# Patient Record
Sex: FEMALE | Race: OTHER | NOT HISPANIC OR LATINO | URBAN - METROPOLITAN AREA
[De-identification: names, ages, dates, MRNs, and addresses within clinical notes are randomized per-mention and may not be internally consistent; named-entity substitution may affect disease eponyms.]

---

## 2017-07-15 ENCOUNTER — INPATIENT (INPATIENT)
Facility: HOSPITAL | Age: 67
LOS: 2 days | Discharge: HOME | End: 2017-07-18
Attending: INTERNAL MEDICINE | Admitting: INTERNAL MEDICINE
Payer: MEDICARE

## 2017-07-15 VITALS
HEIGHT: 65 IN | TEMPERATURE: 98 F | RESPIRATION RATE: 18 BRPM | SYSTOLIC BLOOD PRESSURE: 148 MMHG | WEIGHT: 121.92 LBS | HEART RATE: 66 BPM | OXYGEN SATURATION: 99 % | DIASTOLIC BLOOD PRESSURE: 59 MMHG

## 2017-07-15 DIAGNOSIS — E78.00 PURE HYPERCHOLESTEROLEMIA, UNSPECIFIED: ICD-10-CM

## 2017-07-15 DIAGNOSIS — I10 ESSENTIAL (PRIMARY) HYPERTENSION: ICD-10-CM

## 2017-07-15 DIAGNOSIS — T78.3XXA ANGIONEUROTIC EDEMA, INITIAL ENCOUNTER: ICD-10-CM

## 2017-07-15 DIAGNOSIS — H40.9 UNSPECIFIED GLAUCOMA: ICD-10-CM

## 2017-07-15 LAB
ALBUMIN SERPL ELPH-MCNC: 3.9 G/DL — SIGNIFICANT CHANGE UP (ref 3.3–5)
ALP SERPL-CCNC: 86 U/L — SIGNIFICANT CHANGE UP (ref 40–120)
ALT FLD-CCNC: 36 U/L — SIGNIFICANT CHANGE UP (ref 12–78)
ANION GAP SERPL CALC-SCNC: 6 MMOL/L — SIGNIFICANT CHANGE UP (ref 5–17)
AST SERPL-CCNC: 41 U/L — HIGH (ref 15–37)
BASOPHILS # BLD AUTO: 0.1 K/UL — SIGNIFICANT CHANGE UP (ref 0–0.2)
BASOPHILS NFR BLD AUTO: 2.1 % — HIGH (ref 0–2)
BILIRUB SERPL-MCNC: 0.7 MG/DL — SIGNIFICANT CHANGE UP (ref 0.2–1.2)
BUN SERPL-MCNC: 13 MG/DL — SIGNIFICANT CHANGE UP (ref 7–23)
CALCIUM SERPL-MCNC: 10 MG/DL — SIGNIFICANT CHANGE UP (ref 8.5–10.1)
CHLORIDE SERPL-SCNC: 100 MMOL/L — SIGNIFICANT CHANGE UP (ref 96–108)
CO2 SERPL-SCNC: 33 MMOL/L — HIGH (ref 22–31)
CREAT SERPL-MCNC: 1 MG/DL — SIGNIFICANT CHANGE UP (ref 0.5–1.3)
EOSINOPHIL # BLD AUTO: 0.1 K/UL — SIGNIFICANT CHANGE UP (ref 0–0.5)
EOSINOPHIL NFR BLD AUTO: 2.1 % — SIGNIFICANT CHANGE UP (ref 0–6)
GLUCOSE SERPL-MCNC: 99 MG/DL — SIGNIFICANT CHANGE UP (ref 70–99)
HCT VFR BLD CALC: 36.6 % — SIGNIFICANT CHANGE UP (ref 34.5–45)
HGB BLD-MCNC: 12.9 G/DL — SIGNIFICANT CHANGE UP (ref 11.5–15.5)
LYMPHOCYTES # BLD AUTO: 1.2 K/UL — SIGNIFICANT CHANGE UP (ref 1–3.3)
LYMPHOCYTES # BLD AUTO: 33.8 % — SIGNIFICANT CHANGE UP (ref 13–44)
MCHC RBC-ENTMCNC: 30.3 PG — SIGNIFICANT CHANGE UP (ref 27–34)
MCHC RBC-ENTMCNC: 35.3 GM/DL — SIGNIFICANT CHANGE UP (ref 32–36)
MCV RBC AUTO: 85.8 FL — SIGNIFICANT CHANGE UP (ref 80–100)
MONOCYTES # BLD AUTO: 0.5 K/UL — SIGNIFICANT CHANGE UP (ref 0–0.9)
MONOCYTES NFR BLD AUTO: 13.2 % — SIGNIFICANT CHANGE UP (ref 2–14)
NEUTROPHILS # BLD AUTO: 1.7 K/UL — LOW (ref 1.8–7.4)
NEUTROPHILS NFR BLD AUTO: 48.8 % — SIGNIFICANT CHANGE UP (ref 43–77)
PLAT MORPH BLD: NORMAL — SIGNIFICANT CHANGE UP
PLATELET # BLD AUTO: 186 K/UL — SIGNIFICANT CHANGE UP (ref 150–400)
POTASSIUM SERPL-MCNC: 3.5 MMOL/L — SIGNIFICANT CHANGE UP (ref 3.5–5.3)
POTASSIUM SERPL-SCNC: 3.5 MMOL/L — SIGNIFICANT CHANGE UP (ref 3.5–5.3)
PROT SERPL-MCNC: 7.6 GM/DL — SIGNIFICANT CHANGE UP (ref 6–8.3)
RBC # BLD: 4.26 M/UL — SIGNIFICANT CHANGE UP (ref 3.8–5.2)
RBC # FLD: 12 % — SIGNIFICANT CHANGE UP (ref 11–15)
RBC BLD AUTO: NORMAL — SIGNIFICANT CHANGE UP
SODIUM SERPL-SCNC: 139 MMOL/L — SIGNIFICANT CHANGE UP (ref 135–145)
WBC # BLD: 3.4 K/UL — LOW (ref 3.8–10.5)
WBC # FLD AUTO: 3.4 K/UL — LOW (ref 3.8–10.5)

## 2017-07-15 PROCEDURE — 99292 CRITICAL CARE ADDL 30 MIN: CPT

## 2017-07-15 PROCEDURE — 99291 CRITICAL CARE FIRST HOUR: CPT

## 2017-07-15 RX ORDER — DIPHENHYDRAMINE HCL 50 MG
50 CAPSULE ORAL ONCE
Qty: 0 | Refills: 0 | Status: COMPLETED | OUTPATIENT
Start: 2017-07-15 | End: 2017-07-15

## 2017-07-15 RX ORDER — ALBUTEROL 90 UG/1
2.5 AEROSOL, METERED ORAL EVERY 6 HOURS
Qty: 0 | Refills: 0 | Status: DISCONTINUED | OUTPATIENT
Start: 2017-07-15 | End: 2017-07-18

## 2017-07-15 RX ORDER — METOPROLOL TARTRATE 50 MG
5 TABLET ORAL EVERY 6 HOURS
Qty: 0 | Refills: 0 | Status: DISCONTINUED | OUTPATIENT
Start: 2017-07-15 | End: 2017-07-16

## 2017-07-15 RX ORDER — FAMOTIDINE 10 MG/ML
20 INJECTION INTRAVENOUS EVERY 12 HOURS
Qty: 0 | Refills: 0 | Status: DISCONTINUED | OUTPATIENT
Start: 2017-07-15 | End: 2017-07-16

## 2017-07-15 RX ORDER — FAMOTIDINE 10 MG/ML
20 INJECTION INTRAVENOUS ONCE
Qty: 0 | Refills: 0 | Status: COMPLETED | OUTPATIENT
Start: 2017-07-15 | End: 2017-07-15

## 2017-07-15 RX ORDER — SODIUM CHLORIDE 9 MG/ML
1000 INJECTION, SOLUTION INTRAVENOUS
Qty: 0 | Refills: 0 | Status: DISCONTINUED | OUTPATIENT
Start: 2017-07-15 | End: 2017-07-15

## 2017-07-15 RX ORDER — ENOXAPARIN SODIUM 100 MG/ML
40 INJECTION SUBCUTANEOUS EVERY 24 HOURS
Qty: 0 | Refills: 0 | Status: DISCONTINUED | OUTPATIENT
Start: 2017-07-15 | End: 2017-07-18

## 2017-07-15 RX ORDER — BRIMONIDINE TARTRATE 2 MG/MG
1 SOLUTION/ DROPS OPHTHALMIC
Qty: 0 | Refills: 0 | Status: DISCONTINUED | OUTPATIENT
Start: 2017-07-15 | End: 2017-07-18

## 2017-07-15 RX ORDER — ALBUTEROL 90 UG/1
2.5 AEROSOL, METERED ORAL EVERY 6 HOURS
Qty: 0 | Refills: 0 | Status: DISCONTINUED | OUTPATIENT
Start: 2017-07-15 | End: 2017-07-15

## 2017-07-15 RX ORDER — DIPHENHYDRAMINE HCL 50 MG
50 CAPSULE ORAL EVERY 12 HOURS
Qty: 0 | Refills: 0 | Status: DISCONTINUED | OUTPATIENT
Start: 2017-07-15 | End: 2017-07-16

## 2017-07-15 RX ORDER — SODIUM CHLORIDE 9 MG/ML
1000 INJECTION, SOLUTION INTRAVENOUS
Qty: 0 | Refills: 0 | Status: DISCONTINUED | OUTPATIENT
Start: 2017-07-15 | End: 2017-07-16

## 2017-07-15 RX ADMIN — SODIUM CHLORIDE 75 MILLILITER(S): 9 INJECTION, SOLUTION INTRAVENOUS at 20:30

## 2017-07-15 RX ADMIN — Medication 50 MILLIGRAM(S): at 18:33

## 2017-07-15 RX ADMIN — Medication 50 MILLIGRAM(S): at 10:00

## 2017-07-15 RX ADMIN — Medication 125 MILLIGRAM(S): at 09:55

## 2017-07-15 RX ADMIN — Medication 5 MILLIGRAM(S): at 20:24

## 2017-07-15 RX ADMIN — FAMOTIDINE 20 MILLIGRAM(S): 10 INJECTION INTRAVENOUS at 09:55

## 2017-07-15 NOTE — ED PROVIDER NOTE - PROGRESS NOTE DETAILS
Pt still comfortable, no worsening of swelling, no measurable improvement yet. Breathing comfortably, without distress. ICU consult paged, no response. Pt not improving, but not worsening either. ICU evaluated, pt protecting airway. Will continue to monitor, and possible ICU admission if does not improve.

## 2017-07-15 NOTE — H&P ADULT - NSHPPHYSICALEXAM_GEN_ALL_CORE
GENERAL: NAD, well-groomed, well-developed  HEAD:  Atraumatic, Normocephalic  EYES: EOMI, PERRLA, conjunctiva and sclera clear  ENMT:   Speech clearer,  Upper lip swelling, marked, bilateral. No involvement of lower lip. No tongue involvement  Not able to visualize uvula   NECK: Supple, No JVD, Normal thyroid  NERVOUS SYSTEM:  Alert & Oriented X3, Good concentration; Motor Strength 5/5 B/L upper and lower extremities; DTRs 2+ intact and symmetric  CHEST/LUNG: Clear to percussion bilaterally; No rales, rhonchi, wheezing, or rubs  HEART: Regular rate and rhythm; No murmurs, rubs, or gallops  ABDOMEN: Soft, Nontender, Nondistended; Bowel sounds present  EXTREMITIES:  2+ Peripheral Pulses, No clubbing, cyanosis, or edema  LYMPH: No lymphadenopathy noted  SKIN: No rashes or lesions

## 2017-07-15 NOTE — PROVIDER CONTACT NOTE (EICU) - SITUATION
67F PMH HTN on lisinopril, hyperlipidemia presents to ER with upper lip swelling starting night  prior to admission. Given benadryl, H2 blocker, steroids without improvement. Admitted to ICU for  angioedema secondary to ACE-I. At present time protecting airway

## 2017-07-15 NOTE — H&P ADULT - NSHPREVIEWOFSYSTEMS_GEN_ALL_CORE
CONSTITUTIONAL: No fever, weight loss, or fatigue  EYES: No eye pain, visual disturbances, or discharge  ENMT:  No difficulty hearing, tinnitus, vertigo; No sinus or throat pain + phonation thick   NECK: No pain or stiffness  RESPIRATORY: No cough, wheezing, chills or hemoptysis; No shortness of breath  CARDIOVASCULAR: No chest pain, palpitations, dizziness, or leg swelling  GASTROINTESTINAL: No abdominal or epigastric pain. No nausea, vomiting, or hematemesis; No diarrhea or constipation. No melena or hematochezia.  GENITOURINARY: No dysuria, frequency,  NEUROLOGICAL: No headaches, memory loss, loss of strength, numbness, or tremors  SKIN: No itching, burning, rashes, or lesions   LYMPH NODES: No enlarged glands  ENDOCRINE: No heat or cold intolerance;  MUSCULOSKELETAL: No joint pain or swelling; No muscle, back, or extremity pain  PSYCHIATRIC: No depression, anxiety, mood swings, or difficulty sleeping  HEME/LYMPH: No easy bruising, or bleeding gums  ALLERGY AND IMMUNOLOGIC: No hives or eczema

## 2017-07-15 NOTE — ED ADULT NURSE NOTE - CHIEF COMPLAINT QUOTE
pt complaining of swelling to upper lip since this am. pt has history of htn on lisinopril. denies any respiratory distress.

## 2017-07-15 NOTE — ED PROVIDER NOTE - MEDICAL DECISION MAKING DETAILS
Ddx: Angioedema  Plan: Benadryl, pepcid, solumedrol, observe, pt counselled on no more lisinopril usage or other ACE inhibitor.

## 2017-07-15 NOTE — ED ADULT TRIAGE NOTE - CHIEF COMPLAINT QUOTE
pt complaining of swelling to upper lip since this am. pt has history of htn on lisinopril pt complaining of swelling to upper lip since this am. pt has history of htn on lisinopril. denies any respiratory distress.

## 2017-07-15 NOTE — PHARMACY COMMUNICATION NOTE - COMMENTS
s/w np giovanna - aware crcl<50ml/min, however giovanna wants pt to receive pepcid BID for angioedema

## 2017-07-15 NOTE — H&P ADULT - HISTORY OF PRESENT ILLNESS
Pt is a 68 yo lady with a pmhx of HTN, HL , who presents to the ED with upper lip swelling. She was started on Lisinopril in March. Noticed lip swelling last night around 10 PM. This morning woke up and took her usual Lisinopril dose, and noted that swelling was increased. No sob, no wheezing, no tongue involvement, no pruritis or pain, no hx of this swelling in the past. Given in ER H2 blocker, steroids and benadryl. Upper lip swelling did not  resolve.  Pt did improving, but did not worse. ICU consulted for further management,

## 2017-07-15 NOTE — H&P ADULT - PROBLEM SELECTOR PLAN 1
Admit MICU as d/w  critical care intensivist,  continue H2 blocker, steroids, benadryl, bronchodilators as needed.

## 2017-07-15 NOTE — ED PROVIDER NOTE - CRITICAL CARE PROVIDED
documentation/direct patient care (not related to procedure)/consultation with other physicians/interpretation of diagnostic studies

## 2017-07-15 NOTE — H&P ADULT - NSHPLABSRESULTS_GEN_ALL_CORE
CBC Full  -  ( 15 Jul 2017 10:27 )  WBC Count : 3.4 K/uL  Hemoglobin : 12.9 g/dL  Hematocrit : 36.6 %  Platelet Count - Automated : 186 K/uL  Mean Cell Volume : 85.8 fl  Mean Cell Hemoglobin : 30.3 pg  Mean Cell Hemoglobin Concentration : 35.3 gm/dL  Auto Neutrophil # : 1.7 K/uL  Auto Lymphocyte # : 1.2 K/uL  Auto Monocyte # : 0.5 K/uL  Auto Eosinophil # : 0.1 K/uL  Auto Basophil # : 0.1 K/uL  Auto Neutrophil % : 48.8 %  Auto Lymphocyte % : 33.8 %  Auto Monocyte % : 13.2 %  Auto Eosinophil % : 2.1 %  Auto Basophil % : 2.1 %      07-15    139  |  100  |  13  ----------------------------<  99  3.5   |  33<H>  |  1.00    Ca    10.0      15 Jul 2017 10:27    TPro  7.6  /  Alb  3.9  /  TBili  0.7  /  DBili  x   /  AST  41<H>  /  ALT  36  /  AlkPhos  86  07-15    LIVER FUNCTIONS - ( 15 Jul 2017 10:27 )  Alb: 3.9 g/dL / Pro: 7.6 gm/dL / ALK PHOS: 86 U/L / ALT: 36 U/L / AST: 41 U/L / GGT: x

## 2017-07-15 NOTE — ED PROVIDER NOTE - OBJECTIVE STATEMENT
Pt is a 66 yo lady with a pmhx of HTN, HL who presents to the ED with upper lip swelling. She was started on Lisinopril in March. Noticed lip swelling last night around 10 PM. This morning woke up and took her usual lisionpril dose, and noted that swellign was increased. No sob, no wheezing, no tongue involvement, no pruritis or pain, no hx of this swelling in the past.

## 2017-07-15 NOTE — H&P ADULT - ASSESSMENT
68 y/o  female PMH: HTN, hyperlipidemia presented with upper lip swelling. Will admit to ICU with angioedema, to monitor for airway protection. D/W critical care Dr. Chappell

## 2017-07-16 LAB
ANION GAP SERPL CALC-SCNC: 9 MMOL/L — SIGNIFICANT CHANGE UP (ref 5–17)
BUN SERPL-MCNC: 17 MG/DL — SIGNIFICANT CHANGE UP (ref 7–23)
CALCIUM SERPL-MCNC: 9.5 MG/DL — SIGNIFICANT CHANGE UP (ref 8.5–10.1)
CHLORIDE SERPL-SCNC: 103 MMOL/L — SIGNIFICANT CHANGE UP (ref 96–108)
CHOLEST SERPL-MCNC: 176 MG/DL — SIGNIFICANT CHANGE UP (ref 10–199)
CO2 SERPL-SCNC: 29 MMOL/L — SIGNIFICANT CHANGE UP (ref 22–31)
CREAT SERPL-MCNC: 0.99 MG/DL — SIGNIFICANT CHANGE UP (ref 0.5–1.3)
GLUCOSE SERPL-MCNC: 131 MG/DL — HIGH (ref 70–99)
HBA1C BLD-MCNC: 6.2 % — HIGH (ref 4–5.6)
HCT VFR BLD CALC: 35 % — SIGNIFICANT CHANGE UP (ref 34.5–45)
HDLC SERPL-MCNC: 95 MG/DL — SIGNIFICANT CHANGE UP (ref 40–125)
HGB BLD-MCNC: 12.6 G/DL — SIGNIFICANT CHANGE UP (ref 11.5–15.5)
LIPID PNL WITH DIRECT LDL SERPL: 75 MG/DL — SIGNIFICANT CHANGE UP
MAGNESIUM SERPL-MCNC: 2 MG/DL — SIGNIFICANT CHANGE UP (ref 1.6–2.6)
MCHC RBC-ENTMCNC: 30.9 PG — SIGNIFICANT CHANGE UP (ref 27–34)
MCHC RBC-ENTMCNC: 36.1 GM/DL — HIGH (ref 32–36)
MCV RBC AUTO: 85.6 FL — SIGNIFICANT CHANGE UP (ref 80–100)
PHOSPHATE SERPL-MCNC: 3.3 MG/DL — SIGNIFICANT CHANGE UP (ref 2.5–4.5)
PLATELET # BLD AUTO: 172 K/UL — SIGNIFICANT CHANGE UP (ref 150–400)
POTASSIUM SERPL-MCNC: 3.9 MMOL/L — SIGNIFICANT CHANGE UP (ref 3.5–5.3)
POTASSIUM SERPL-SCNC: 3.9 MMOL/L — SIGNIFICANT CHANGE UP (ref 3.5–5.3)
RBC # BLD: 4.1 M/UL — SIGNIFICANT CHANGE UP (ref 3.8–5.2)
RBC # FLD: 11.8 % — SIGNIFICANT CHANGE UP (ref 11–15)
SODIUM SERPL-SCNC: 141 MMOL/L — SIGNIFICANT CHANGE UP (ref 135–145)
TOTAL CHOLESTEROL/HDL RATIO MEASUREMENT: 1.9 RATIO — LOW (ref 3.3–7.1)
TRIGL SERPL-MCNC: 31 MG/DL — SIGNIFICANT CHANGE UP (ref 10–149)
WBC # BLD: 8.1 K/UL — SIGNIFICANT CHANGE UP (ref 3.8–10.5)
WBC # FLD AUTO: 8.1 K/UL — SIGNIFICANT CHANGE UP (ref 3.8–10.5)

## 2017-07-16 PROCEDURE — 93010 ELECTROCARDIOGRAM REPORT: CPT

## 2017-07-16 RX ORDER — LATANOPROST 0.05 MG/ML
1 SOLUTION/ DROPS OPHTHALMIC; TOPICAL AT BEDTIME
Qty: 0 | Refills: 0 | Status: DISCONTINUED | OUTPATIENT
Start: 2017-07-16 | End: 2017-07-18

## 2017-07-16 RX ORDER — FAMOTIDINE 10 MG/ML
20 INJECTION INTRAVENOUS
Qty: 0 | Refills: 0 | Status: DISCONTINUED | OUTPATIENT
Start: 2017-07-16 | End: 2017-07-16

## 2017-07-16 RX ORDER — FAMOTIDINE 10 MG/ML
20 INJECTION INTRAVENOUS DAILY
Qty: 0 | Refills: 0 | Status: COMPLETED | OUTPATIENT
Start: 2017-07-16 | End: 2017-07-18

## 2017-07-16 RX ORDER — AMLODIPINE BESYLATE 2.5 MG/1
5 TABLET ORAL DAILY
Qty: 0 | Refills: 0 | Status: DISCONTINUED | OUTPATIENT
Start: 2017-07-16 | End: 2017-07-18

## 2017-07-16 RX ORDER — DIPHENHYDRAMINE HCL 50 MG
50 CAPSULE ORAL EVERY 12 HOURS
Qty: 0 | Refills: 0 | Status: DISCONTINUED | OUTPATIENT
Start: 2017-07-16 | End: 2017-07-18

## 2017-07-16 RX ADMIN — ENOXAPARIN SODIUM 40 MILLIGRAM(S): 100 INJECTION SUBCUTANEOUS at 05:55

## 2017-07-16 RX ADMIN — FAMOTIDINE 20 MILLIGRAM(S): 10 INJECTION INTRAVENOUS at 11:27

## 2017-07-16 RX ADMIN — LATANOPROST 1 DROP(S): 0.05 SOLUTION/ DROPS OPHTHALMIC; TOPICAL at 22:00

## 2017-07-16 RX ADMIN — Medication 40 MILLIGRAM(S): at 05:56

## 2017-07-16 RX ADMIN — BRIMONIDINE TARTRATE 1 DROP(S): 2 SOLUTION/ DROPS OPHTHALMIC at 05:56

## 2017-07-16 RX ADMIN — Medication 5 MILLIGRAM(S): at 01:00

## 2017-07-16 RX ADMIN — FAMOTIDINE 20 MILLIGRAM(S): 10 INJECTION INTRAVENOUS at 05:56

## 2017-07-16 RX ADMIN — Medication 50 MILLIGRAM(S): at 05:56

## 2017-07-16 RX ADMIN — Medication 5 MILLIGRAM(S): at 05:56

## 2017-07-16 RX ADMIN — Medication 50 MILLIGRAM(S): at 17:13

## 2017-07-16 RX ADMIN — AMLODIPINE BESYLATE 5 MILLIGRAM(S): 2.5 TABLET ORAL at 11:27

## 2017-07-16 RX ADMIN — Medication 40 MILLIGRAM(S): at 11:27

## 2017-07-16 RX ADMIN — Medication 40 MILLIGRAM(S): at 01:00

## 2017-07-16 RX ADMIN — BRIMONIDINE TARTRATE 1 DROP(S): 2 SOLUTION/ DROPS OPHTHALMIC at 17:13

## 2017-07-16 NOTE — PROGRESS NOTE ADULT - ASSESSMENT
66 y/o  female PMH: HTN, hyperlipidemia presented with angioedema sec to ACEI. admitted to ICU with angioedema, to monitor for airway protection,    -now resolved  -switch to PO prednisone  -cont benadryl and pepcid  -start on norvasc for BP control  -monitor BP  -DVT/GI ppx

## 2017-07-16 NOTE — CHART NOTE - NSCHARTNOTEFT_GEN_A_CORE
[facility] NP NOTE    HPI   Pt is a 68 yo lady with a pmhx of HTN, HL , who presents to the ED with upper lip swelling. She was started on Lisinopril in March. Noticed lip swelling last night around 10 PM. This morning woke up and took her usual Lisinopril dose, and noted that swelling was increased. No sob, no wheezing, no tongue involvement, no pruritis or pain, no hx of this swelling in the past. Given in ER H2 blocker, steroids and benadryl. Upper lip swelling did not  resolve.  Pt did improving, but did not worse. ICU consulted for further management. Patient  admitted to MICU to monitor airway.  Continued H2 blocker, steroids, benadryl, bronchodilators. Patient's condition improved. Able to take PO. Placed on Norvasc for BP control. IV steroids, H2 blocker and benadryl changed to PO to be continued for additional 3 days.  Patient  stable for transfer to non monitored bed.   Report given to Dr. Ailyn Galaviz, NP-C  Critical Care

## 2017-07-16 NOTE — PROGRESS NOTE ADULT - SUBJECTIVE AND OBJECTIVE BOX
HPI:  Pt is a 66 yo lady with a pmhx of HTN, HL , who presents to the ED with upper lip swelling. She was started on Lisinopril in March. Noticed lip swelling last night around 10 PM. This morning woke up and took her usual Lisinopril dose, and noted that swelling was increased. No sob, no wheezing, no tongue involvement, no pruritis or pain, no hx of this swelling in the past. Given in ER H2 blocker, steroids and benadryl. Upper lip swelling did not  resolve.  Pt did improving, but did not worse. ICU consulted for further management, (15 Jul 2017 19:02)      PAST MEDICAL & SURGICAL HISTORY:  Glaucoma  Hypercholesterolemia  HTN (hypertension)      REVIEW OF SYSTEMS:    CONSTITUTIONAL:   comfortable presents  no  complaints  EYES: No eye pain, visual disturbances, or  ENMT:  No difficulty hearing,No sinus or throat pain swollen  upper  lip  NECK: No pain or stiffness  BREASTS: No pain,  RESPIRATORY: No cough,  No shortness of breath  CARDIOVASCULAR: No chest pain, palpitations, dizziness,   GASTROINTESTINAL: No abdominal or epigastric pain. No nausea, vomiting, or hematemesis; No diarrhea or constipation. No melena or hematochezia.  GENITOURINARY: No dysuria, frequency, hematuria, or incontinence  NEUROLOGICAL: No headaches, loss of strength, numbness, or tremors  SKIN: No itching, burning, rashes, or lesions   LYMPH NODES: No enlarged glands  MUSCULOSKELETAL: No joint pain no  back  pain  PSYCHIATRIC: No depression, anxiety, mood swings,   HEME/LYMPH: No easy bruising, or bleeding gums  ALLERY AND IMMUNOLOGIC: No hives or eczema      MEDICATIONS  (STANDING):  enoxaparin Injectable 40 milliGRAM(s) SubCutaneous every 24 hours  brimonidine 0.2% Ophthalmic Solution 1 Drop(s) Both EYES two times a day  amLODIPine   Tablet 5 milliGRAM(s) Oral daily  predniSONE   Tablet 40 milliGRAM(s) Oral daily  diphenhydrAMINE   Capsule 50 milliGRAM(s) Oral every 12 hours  famotidine    Tablet 20 milliGRAM(s) Oral daily    MEDICATIONS  (PRN):  ALBUTerol    0.083% 2.5 milliGRAM(s) Nebulizer every 6 hours PRN Bronchospasm      Allergies    lisinopril (Angioedema)    Intolerances        SOCIAL HISTORY:    FAMILY HISTORY:  No pertinent family history in first degree relatives      Vital Signs Last 24 Hrs  T(C): 36.7 (16 Jul 2017 11:25), Max: 37.1 (16 Jul 2017 03:00)  T(F): 98 (16 Jul 2017 11:25), Max: 98.8 (16 Jul 2017 03:00)  HR: 72 (16 Jul 2017 09:00) (57 - 78)  BP: 156/53 (16 Jul 2017 08:00) (116/62 - 193/75)  BP(mean): 80 (16 Jul 2017 08:00) (70 - 142)  RR: 15 (16 Jul 2017 09:00) (10 - 28)  SpO2: 98% (16 Jul 2017 09:00) (96% - 100%)    PHYSICAL EXAM:    GENERAL: NAD, well-groomed, well-developed  HEAD:  Atraumatic, Normocephalic  EYES: EOMI, PERRLA, conjunctiva and sclera clear  ENMT:  swollwe  upper  lip No tonsillar erythema, exudates, or enlargement; Moist mucous membranes, , No lesions  NECK: Supple, No JVD, Normal thyroid  NERVOUS SYSTEM:  Alert & Oriented X4, ; Motor Strength 5/5 B/L upper and lower extremities; DTRs 2+ intact and symmetric  CHEST/LUNG: Clear  bilaterally; No rales, rhonchi, wheezing, or rubs  HEART: Regular rate and rhythm; No murmurs, rubs, or gallops  ABDOMEN: Soft, Nontender, Nondistended; no  masses Bowel sounds present  EXTREMITIES:  + Peripheral Pulses, No clubbing, cyanosis, or edema  LYMPH: No lymphadenopathy noted   RECTAL: deferred  SKIN: No rashes or lesions      LABS:                        12.6   8.1   )-----------( 172      ( 16 Jul 2017 03:56 )             35.0     07-16    141  |  103  |  17  ----------------------------<  131<H>  3.9   |  29  |  0.99    Ca    9.5      16 Jul 2017 03:56  Phos  3.3     07-16  Mg     2.0     07-16    TPro  7.6  /  Alb  3.9  /  TBili  0.7  /  DBili  x   /  AST  41<H>  /  ALT  36  /  AlkPhos  86  07-15          RADIOLOGY & ADDITIONAL STUDIES:  HPI:  Pt is a 66 yo lady with a pmhx of HTN, HL , who presents to the ED with upper lip swelling. She was started on Lisinopril in March. Noticed lip swelling last night around 10 PM. This morning woke up and took her usual Lisinopril dose, and noted that swelling was increased. No sob, no wheezing, no tongue involvement, no pruritis or pain, no hx of this swelling in the past. Given in ER H2 blocker, steroids and benadryl. Upper lip swelling did not  resolve.  Pt did improving, but did not worse. ICU consulted for further management, (15 Jul 2017 19:02)    HPI:  Pt is a 66 yo lady with a pmhx of HTN, HL , who presents to the ED with upper lip swelling. She was started on Lisinopril in March. Noticed lip swelling last night around 10 PM. This morning woke up and took her usual Lisinopril dose, and noted that swelling was increased. No sob, no wheezing, no tongue involvement, no pruritis or pain, no hx of this swelling in the past. Given in ER H2 blocker, steroids and benadryl. Upper lip swelling did not  resolve.  Pt did improving, but did not worse. ICU consulted for further management, (15 Jul 2017 19:02)    PAST MEDICAL & SURGICAL HISTORY:  Glaucoma  Hypercholesterolemia  HTN (hypertension)      REVIEW OF SYSTEMS:    CONSTITUTIONAL:   comfortable presents  no  complaints  EYES: No eye pain, visual disturbances, or  ENMT:  No difficulty hearing,No sinus or throat pain  NECK: No pain or stiffness  BREASTS: No pain,  RESPIRATORY: No cough,  No shortness of breath  CARDIOVASCULAR: No chest pain, palpitations, dizziness,   GASTROINTESTINAL: No abdominal or epigastric pain. No nausea, vomiting, or hematemesis; No diarrhea or constipation. No melena or hematochezia.  GENITOURINARY: No dysuria, frequency, hematuria, or incontinence  NEUROLOGICAL: No headaches, loss of strength, numbness, or tremors  SKIN: No itching, burning, rashes, or lesions   LYMPH NODES: No enlarged glands  MUSCULOSKELETAL: No joint pain no  back  pain  PSYCHIATRIC: No depression, anxiety, mood swings,   HEME/LYMPH: No easy bruising, or bleeding gums  ALLERY AND IMMUNOLOGIC: No hives or eczema      MEDICATIONS  (STANDING):  enoxaparin Injectable 40 milliGRAM(s) SubCutaneous every 24 hours  brimonidine 0.2% Ophthalmic Solution 1 Drop(s) Both EYES two times a day  amLODIPine   Tablet 5 milliGRAM(s) Oral daily  predniSONE   Tablet 40 milliGRAM(s) Oral daily  diphenhydrAMINE   Capsule 50 milliGRAM(s) Oral every 12 hours  famotidine    Tablet 20 milliGRAM(s) Oral daily    MEDICATIONS  (PRN):  ALBUTerol    0.083% 2.5 milliGRAM(s) Nebulizer every 6 hours PRN Bronchospasm      Allergies    lisinopril (Angioedema)    Intolerances        SOCIAL HISTORY:    FAMILY HISTORY:  No pertinent family history in first degree relatives      Vital Signs Last 24 Hrs  T(C): 36.7 (16 Jul 2017 11:25), Max: 37.1 (16 Jul 2017 03:00)  T(F): 98 (16 Jul 2017 11:25), Max: 98.8 (16 Jul 2017 03:00)  HR: 72 (16 Jul 2017 09:00) (57 - 78)  BP: 156/53 (16 Jul 2017 08:00) (116/62 - 193/75)  BP(mean): 80 (16 Jul 2017 08:00) (70 - 142)  RR: 15 (16 Jul 2017 09:00) (10 - 28)  SpO2: 98% (16 Jul 2017 09:00) (96% - 100%)    PHYSICAL EXAM:    GENERAL: NAD, well-groomed, well-developed  HEAD:  Atraumatic, Normocephalic  EYES: EOMI, PERRLA, conjunctiva and sclera clear  ENMT: No tonsillar erythema, exudates, or enlargement; Moist mucous membranes, , No lesions  NECK: Supple, No JVD, Normal thyroid  NERVOUS SYSTEM:  Alert & Oriented X4, ; Motor Strength 5/5 B/L upper and lower extremities; DTRs 2+ intact and symmetric  CHEST/LUNG: Clear  bilaterally; No rales, rhonchi, wheezing, or rubs  HEART: Regular rate and rhythm; No murmurs, rubs, or gallops  ABDOMEN: Soft, Nontender, Nondistended; no  masses Bowel sounds present  EXTREMITIES:  + Peripheral Pulses, No clubbing, cyanosis, or edema  LYMPH: No lymphadenopathy noted   RECTAL: deferred  BREAST: No palpatble masses, skin no lesions no retractions, no discharages. adenexa no palpable masses noted   SKIN: No rashes or lesions      LABS:                        12.6   8.1   )-----------( 172      ( 16 Jul 2017 03:56 )             35.0     07-16    141  |  103  |  17  ----------------------------<  131<H>  3.9   |  29  |  0.99    Ca    9.5      16 Jul 2017 03:56  Phos  3.3     07-16  Mg     2.0     07-16    TPro  7.6  /  Alb  3.9  /  TBili  0.7  /  DBili  x   /  AST  41<H>  /  ALT  36  /  AlkPhos  86  07-15          RADIOLOGY & ADDITIONAL STUDIES:

## 2017-07-16 NOTE — PROGRESS NOTE ADULT - SUBJECTIVE AND OBJECTIVE BOX
HPI:  Pt is a 68 yo lady with a pmhx of HTN, HL , who presents to the ED with upper lip swelling. She was started on Lisinopril in March. Noticed lip swelling last night around 10 PM. This morning woke up and took her usual Lisinopril dose, and noted that swelling was increased. No sob, no wheezing, no tongue involvement, no pruritis or pain, no hx of this swelling in the past. Given in ER H2 blocker, steroids and benadryl. Upper lip swelling did not  resolve.  Pt did improving, but did not worse. ICU consulted for further management, (15 Jul 2017 19:02)    Over 24 Hrs: swelling decreased, pt talking and eating    PAST MEDICAL & SURGICAL HISTORY:  Glaucoma  Hypercholesterolemia  HTN (hypertension)      ## ROS:   CONSTITUTIONAL: No fever, chills  EYES: No eye pain, visual disturbances  ENMT:  No difficulty hearing, No sinus or throat pain  RESPIRATORY: No cough, wheezing, hemoptysis; No shortness of breath  CARDIOVASCULAR: No chest pain, palpitations  GASTROINTESTINAL: No abdominal or epigastric pain. No nausea, vomiting, or hematemesis; No diarrhea. No melena or hematochezia.  GENITOURINARY: No dysuria, frequency, hematuria  NEUROLOGICAL: No headaches  ENDOCRINE: No heat or cold intolerance  MUSCULOSKELETAL: No joint pain or swelling; No muscle, back, or extremity pain    ## O/E:  Vitals: T(C): 36.7 (07-16-17 @ 11:25), Max: 37.1 (07-16-17 @ 03:00)  HR: 71 (07-16-17 @ 11:32) (59 - 78)  BP: 147/56 (07-16-17 @ 11:25) (116/62 - 193/75)  BP(mean): 80 (07-16-17 @ 11:25) (70 - 142)  RR: 16 (07-16-17 @ 11:32) (10 - 28)  SpO2: 99% (07-16-17 @ 11:32) (96% - 100%)  Wt(kg): --  Gen: lying comfortably in bed in no apparent distress  HEENT: PERRL, EOMI, upper lip mildly swollen  Resp: CTA B/L no c/r/w  CVS: S1S2 no m/r/g  Abd: soft NT/ND +BS  Ext: no c/c/e  Neuro: A&Ox3      07-15 @ 07:01 - 07-16 @ 07:00  --------------------------------------------------------  IN: 1200 mL / OUT: 1000 mL / NET: 200 mL    07-16 @ 07:01  - 07-16 @ 13:57  --------------------------------------------------------  IN: 520 mL / OUT: 400 mL / NET: 120 mL          ## Labs:                        12.6   8.1   )-----------( 172      ( 16 Jul 2017 03:56 )             35.0     07-16    141  |  103  |  17  ----------------------------<  131<H>  3.9   |  29  |  0.99    Ca    9.5      16 Jul 2017 03:56  Phos  3.3     07-16  Mg     2.0     07-16    TPro  7.6  /  Alb  3.9  /  TBili  0.7  /  DBili  x   /  AST  41<H>  /  ALT  36  /  AlkPhos  86  07-15        ## Imaging: reviewed    MEDICATIONS  (STANDING):  enoxaparin Injectable 40 milliGRAM(s) SubCutaneous every 24 hours  brimonidine 0.2% Ophthalmic Solution 1 Drop(s) Both EYES two times a day  amLODIPine   Tablet 5 milliGRAM(s) Oral daily  predniSONE   Tablet 40 milliGRAM(s) Oral daily  diphenhydrAMINE   Capsule 50 milliGRAM(s) Oral every 12 hours  famotidine    Tablet 20 milliGRAM(s) Oral daily  latanoprost 0.005% Ophthalmic Solution 1 Drop(s) Both EYES at bedtime    MEDICATIONS  (PRN):  ALBUTerol    0.083% 2.5 milliGRAM(s) Nebulizer every 6 hours PRN Bronchospasm    ## Code status:  Goals of care discussion: [x] yes [ ] no  [x] full code  [ ] DNR  [ ] DNI  [ ] JONIST

## 2017-07-17 LAB
ANION GAP SERPL CALC-SCNC: 7 MMOL/L — SIGNIFICANT CHANGE UP (ref 5–17)
BUN SERPL-MCNC: 18 MG/DL — SIGNIFICANT CHANGE UP (ref 7–23)
CALCIUM SERPL-MCNC: 9.7 MG/DL — SIGNIFICANT CHANGE UP (ref 8.5–10.1)
CHLORIDE SERPL-SCNC: 105 MMOL/L — SIGNIFICANT CHANGE UP (ref 96–108)
CO2 SERPL-SCNC: 32 MMOL/L — HIGH (ref 22–31)
CREAT SERPL-MCNC: 1.01 MG/DL — SIGNIFICANT CHANGE UP (ref 0.5–1.3)
GLUCOSE SERPL-MCNC: 118 MG/DL — HIGH (ref 70–99)
HCT VFR BLD CALC: 35.1 % — SIGNIFICANT CHANGE UP (ref 34.5–45)
HGB BLD-MCNC: 12.5 G/DL — SIGNIFICANT CHANGE UP (ref 11.5–15.5)
MAGNESIUM SERPL-MCNC: 2.6 MG/DL — SIGNIFICANT CHANGE UP (ref 1.6–2.6)
MCHC RBC-ENTMCNC: 30.6 PG — SIGNIFICANT CHANGE UP (ref 27–34)
MCHC RBC-ENTMCNC: 35.7 GM/DL — SIGNIFICANT CHANGE UP (ref 32–36)
MCV RBC AUTO: 85.7 FL — SIGNIFICANT CHANGE UP (ref 80–100)
PHOSPHATE SERPL-MCNC: 1.5 MG/DL — LOW (ref 2.5–4.5)
PLATELET # BLD AUTO: 173 K/UL — SIGNIFICANT CHANGE UP (ref 150–400)
POTASSIUM SERPL-MCNC: 4 MMOL/L — SIGNIFICANT CHANGE UP (ref 3.5–5.3)
POTASSIUM SERPL-SCNC: 4 MMOL/L — SIGNIFICANT CHANGE UP (ref 3.5–5.3)
RBC # BLD: 4.09 M/UL — SIGNIFICANT CHANGE UP (ref 3.8–5.2)
RBC # FLD: 12.1 % — SIGNIFICANT CHANGE UP (ref 11–15)
SODIUM SERPL-SCNC: 144 MMOL/L — SIGNIFICANT CHANGE UP (ref 135–145)
WBC # BLD: 7.8 K/UL — SIGNIFICANT CHANGE UP (ref 3.8–10.5)
WBC # FLD AUTO: 7.8 K/UL — SIGNIFICANT CHANGE UP (ref 3.8–10.5)

## 2017-07-17 RX ORDER — HYDRALAZINE HCL 50 MG
10 TABLET ORAL ONCE
Qty: 0 | Refills: 0 | Status: COMPLETED | OUTPATIENT
Start: 2017-07-17 | End: 2017-07-17

## 2017-07-17 RX ADMIN — Medication 50 MILLIGRAM(S): at 18:08

## 2017-07-17 RX ADMIN — Medication 40 MILLIGRAM(S): at 06:18

## 2017-07-17 RX ADMIN — BRIMONIDINE TARTRATE 1 DROP(S): 2 SOLUTION/ DROPS OPHTHALMIC at 18:08

## 2017-07-17 RX ADMIN — Medication 10 MILLIGRAM(S): at 19:01

## 2017-07-17 RX ADMIN — Medication 50 MILLIGRAM(S): at 06:18

## 2017-07-17 RX ADMIN — FAMOTIDINE 20 MILLIGRAM(S): 10 INJECTION INTRAVENOUS at 11:46

## 2017-07-17 RX ADMIN — AMLODIPINE BESYLATE 5 MILLIGRAM(S): 2.5 TABLET ORAL at 06:18

## 2017-07-17 RX ADMIN — ENOXAPARIN SODIUM 40 MILLIGRAM(S): 100 INJECTION SUBCUTANEOUS at 06:18

## 2017-07-17 RX ADMIN — LATANOPROST 1 DROP(S): 0.05 SOLUTION/ DROPS OPHTHALMIC; TOPICAL at 21:40

## 2017-07-17 RX ADMIN — BRIMONIDINE TARTRATE 1 DROP(S): 2 SOLUTION/ DROPS OPHTHALMIC at 06:18

## 2017-07-17 NOTE — PROGRESS NOTE ADULT - SUBJECTIVE AND OBJECTIVE BOX
INTERVAL HPI/OVERNIGHT EVENTS:        REVIEW OF SYSTEMS:  CONSTITUTIONAL: feels  much  improved  lip  swelling  resolved    NECK: No pain or stiffnes  RESPIRATORY: No SOB   CARDIOVASCULAR: No chest pain, palpitations, dizziness,   GASTROINTESTINAL: No abdominal pain. No nausea, vomiting,   NEUROLOGICAL: No headaches, no  blurry  vision no  dizziness  SKIN: No itching,   MUSCULOSKELETAL: No pain    MEDICATION:  enoxaparin Injectable 40 milliGRAM(s) SubCutaneous every 24 hours  brimonidine 0.2% Ophthalmic Solution 1 Drop(s) Both EYES two times a day  ALBUTerol    0.083% 2.5 milliGRAM(s) Nebulizer every 6 hours PRN  amLODIPine   Tablet 5 milliGRAM(s) Oral daily  predniSONE   Tablet 40 milliGRAM(s) Oral daily  diphenhydrAMINE   Capsule 50 milliGRAM(s) Oral every 12 hours  famotidine    Tablet 20 milliGRAM(s) Oral daily  latanoprost 0.005% Ophthalmic Solution 1 Drop(s) Both EYES at bedtime    Vital Signs Last 24 Hrs  T(C): 36.9 (17 Jul 2017 05:05), Max: 37.2 (16 Jul 2017 20:14)  T(F): 98.4 (17 Jul 2017 05:05), Max: 99 (16 Jul 2017 20:14)  HR: 70 (17 Jul 2017 05:05) (63 - 76)  BP: 168/72 (17 Jul 2017 05:05) (124/99 - 168/72)  BP(mean): 81 (16 Jul 2017 16:11) (79 - 106)  RR: 17 (17 Jul 2017 05:05) (11 - 25)  SpO2: 97% (17 Jul 2017 05:05) (96% - 100%)    PHYSICAL EXAM:  GENERAL: NAD, well-groomed, well-developed  EYES:  conjunctiva and sclera clear  ENMT:  Moist mucous membranes,   NECK: Supple, No JVD, Normal thyroid  NERVOUS SYSTEM:  Alert oriented   no  focal  deficits;   CHEST/LUNG: Clear    HEART: Regular rate and rhythm; No murmurs, rubs, or gallops  ABDOMEN: Soft, Nontender, Nondistended; Bowel sounds present  EXTREMITIES:  no  edema no  tenderness  SKIN: No rashes   LABS:                        12.5   7.8   )-----------( 173      ( 17 Jul 2017 07:33 )             35.1     07-17    144  |  105  |  18  ----------------------------<  118<H>  4.0   |  32<H>  |  1.01    Ca    9.7      17 Jul 2017 07:33  Phos  1.5     07-17  Mg     2.6     07-17    TPro  7.6  /  Alb  3.9  /  TBili  0.7  /  DBili  x   /  AST  41<H>  /  ALT  36  /  AlkPhos  86  07-15        CAPILLARY BLOOD GLUCOSE          RADIOLOGY & ADDITIONAL TESTS:    Imaging reports  Personally Reviewed:  [ x] YES  [ ] NO    Consultant(s) Notes Reviewed:  [x ] YES  [ ] NO    Care Discussed with Consultants/Other Providers [x ] YES  [ ] NO  Assessment and Plan:   Problem/Plan - 1:  ·  Problem: Angioedema, initial encounter.  Plan: off  ACE  on  steroids  antihstamine  H2  blockers.     Problem/Plan - 2:  ·  Problem: HTN (hypertension).  Plan: norvasc. might  need  further  adjustment    Problem/Plan - 3:  ·  Problem: Glaucoma.  Plan: brimonidine.     Problem/Plan - 4:  ·  Problem: Hypercholesterolemia.  Plan: restart  atorvastatin.   discharge  in  AM    Electronic Signatures:  Jase Robertson)  (Signed 16-Jul-2017 11:30)  	Authored: Progress Note, Subjective and Objective, Assessment and Plan      Last Updated: 16-Jul-2017 11:30 by Jase Robertson)

## 2017-07-18 ENCOUNTER — TRANSCRIPTION ENCOUNTER (OUTPATIENT)
Age: 67
End: 2017-07-18

## 2017-07-18 VITALS — SYSTOLIC BLOOD PRESSURE: 146 MMHG | DIASTOLIC BLOOD PRESSURE: 69 MMHG

## 2017-07-18 RX ORDER — LATANOPROST 0.05 MG/ML
1 SOLUTION/ DROPS OPHTHALMIC; TOPICAL
Qty: 0 | Refills: 0 | COMMUNITY
Start: 2017-07-18

## 2017-07-18 RX ORDER — HYDROCHLOROTHIAZIDE 25 MG
25 TABLET ORAL DAILY
Qty: 0 | Refills: 0 | Status: DISCONTINUED | OUTPATIENT
Start: 2017-07-18 | End: 2017-07-18

## 2017-07-18 RX ORDER — AMLODIPINE BESYLATE 2.5 MG/1
1 TABLET ORAL
Qty: 30 | Refills: 0 | OUTPATIENT
Start: 2017-07-18 | End: 2017-08-17

## 2017-07-18 RX ADMIN — BRIMONIDINE TARTRATE 1 DROP(S): 2 SOLUTION/ DROPS OPHTHALMIC at 06:00

## 2017-07-18 RX ADMIN — ENOXAPARIN SODIUM 40 MILLIGRAM(S): 100 INJECTION SUBCUTANEOUS at 05:59

## 2017-07-18 RX ADMIN — Medication 40 MILLIGRAM(S): at 06:00

## 2017-07-18 RX ADMIN — FAMOTIDINE 20 MILLIGRAM(S): 10 INJECTION INTRAVENOUS at 11:11

## 2017-07-18 RX ADMIN — Medication 50 MILLIGRAM(S): at 06:00

## 2017-07-18 RX ADMIN — AMLODIPINE BESYLATE 5 MILLIGRAM(S): 2.5 TABLET ORAL at 06:00

## 2017-07-18 NOTE — DISCHARGE NOTE ADULT - HOSPITAL COURSE
patient  observed  in  ICU  of f AE  on  Steroids H2  blockers  Antihistamine  labial  and  oropharyngeal  swelling  resolved. no  difficulty  breatihng  no  difficulty  swallowing  ambulating  freely HCTZ continued  Norvasc  added  to  regimen patient  discharged  home  off [facility]

## 2017-07-18 NOTE — DISCHARGE NOTE ADULT - MEDICATION SUMMARY - MEDICATIONS TO TAKE
I will START or STAY ON the medications listed below when I get home from the hospital:    atorvastatin 40 mg oral tablet  -- 1 tab(s) by mouth once a day  -- Indication: For Hypercholesterolemia    amLODIPine 5 mg oral tablet  -- 1 tab(s) by mouth once a day  -- Indication: For HTN (hypertension)    hydroCHLOROthiazide 25 mg oral tablet  -- 1 tab(s) by mouth once a day  -- Indication: For HTN (hypertension)    latanoprost 0.005% ophthalmic solution  -- 1 drop(s) to each affected eye once a day (at bedtime)  -- Indication: For laucoma    brimonidine 0.2% ophthalmic solution  --  to each affected eye   -- Indication: For Glaucoma

## 2017-07-18 NOTE — DISCHARGE NOTE ADULT - PATIENT PORTAL LINK FT
“You can access the [employer] Patient Portal, offered by [facility], by registering with the following website: [email or web address]”

## 2017-07-18 NOTE — DISCHARGE NOTE ADULT - MEDICATION SUMMARY - MEDICATIONS TO STOP TAKING
I will STOP taking the medications listed below when I get home from the hospital:    lisinopril 40 mg oral tablet  -- 1 tab(s) by mouth once a day    strong heart  --    omegaxl  --    calcium  --    probioticxl  --

## 2017-07-18 NOTE — PROGRESS NOTE ADULT - SUBJECTIVE AND OBJECTIVE BOX
INTERVAL HPI/OVERNIGHT EVENTS:        REVIEW OF SYSTEMS:  CONSTITUTIONAL:feels  wwell presents   no  complaints    NECK: No pain or stiffnes  RESPIRATORY: No SOB   CARDIOVASCULAR: No chest pain, palpitations, dizziness,   GASTROINTESTINAL: No abdominal pain. No nausea, vomiting,   NEUROLOGICAL: No headaches, no  blurry  vision no  dizziness  SKIN: No itching,   MUSCULOSKELETAL: No pain    MEDICATION:  enoxaparin Injectable 40 milliGRAM(s) SubCutaneous every 24 hours  brimonidine 0.2% Ophthalmic Solution 1 Drop(s) Both EYES two times a day  ALBUTerol    0.083% 2.5 milliGRAM(s) Nebulizer every 6 hours PRN  amLODIPine   Tablet 5 milliGRAM(s) Oral daily  diphenhydrAMINE   Capsule 50 milliGRAM(s) Oral every 12 hours  famotidine    Tablet 20 milliGRAM(s) Oral daily  latanoprost 0.005% Ophthalmic Solution 1 Drop(s) Both EYES at bedtime  hydrochlorothiazide 25 milliGRAM(s) Oral daily    Vital Signs Last 24 Hrs  T(C): 36.7 (18 Jul 2017 05:09), Max: 37.1 (17 Jul 2017 17:51)  T(F): 98 (18 Jul 2017 05:09), Max: 98.8 (17 Jul 2017 17:51)  HR: 70 (18 Jul 2017 05:09) (70 - 87)  BP: 146/69 (18 Jul 2017 06:56) (146/69 - 177/84)  BP(mean): --  RR: 17 (18 Jul 2017 05:09) (17 - 18)  SpO2: 98% (18 Jul 2017 05:09) (97% - 98%)    PHYSICAL EXAM:  GENERAL: NAD, well-groomed, well-developed  EYES:  conjunctiva and sclera clear  ENMT:  Moist mucous membranes,   NECK: Supple, No JVD, Normal thyroid  NERVOUS SYSTEM:  Alert oriented   no  focal  deficits;   CHEST/LUNG: Clear    HEART: Regular rate and rhythm; No murmurs, rubs, or gallops  ABDOMEN: Soft, Nontender, Nondistended; Bowel sounds present  EXTREMITIES:  no  edema no  tenderness  SKIN: No rashes   LABS:                        12.5   7.8   )-----------( 173      ( 17 Jul 2017 07:33 )             35.1     07-17    144  |  105  |  18  ----------------------------<  118<H>  4.0   |  32<H>  |  1.01    Ca    9.7      17 Jul 2017 07:33  Phos  1.5     07-17  Mg     2.6     07-17          CAPILLARY BLOOD GLUCOSE          RADIOLOGY & ADDITIONAL TESTS:    Imaging reports  Personally Reviewed:  [x ] YES  [ ] NO      Care Discussed with Consultants/Other Providers [ x] YES  [ ] NO  Assessment and Plan:   Problem/Plan - 1:  ·  Problem: Angioedema, initial encounter.  Plan: off  ACE  on  steroids  antihstamine  H2  blockers.     Problem/Plan - 2:  ·  Problem: HTN (hypertension).  Plan: norvasc. might  need  further  adjustment    Problem/Plan - 3:  ·  Problem: Glaucoma.  Plan: brimonidine.     Problem/Plan - 4:  ·  Problem: Hypercholesterolemia.  Plan: restart  atorvastatin.   discharge  home

## 2017-07-18 NOTE — DISCHARGE NOTE ADULT - CARE PLAN
Principal Discharge DX:	Angioedema, initial encounter  Goal:	avidanc e f recurrence  Instructions for follow-up, activity and diet:	avid  ACE  Secondary Diagnosis:	HTN (hypertension)  Secondary Diagnosis:	Hypercholesterolemia  Secondary Diagnosis:	Glaucoma

## 2017-07-20 DIAGNOSIS — E78.00 PURE HYPERCHOLESTEROLEMIA, UNSPECIFIED: ICD-10-CM

## 2017-07-20 DIAGNOSIS — I10 ESSENTIAL (PRIMARY) HYPERTENSION: ICD-10-CM

## 2017-07-20 DIAGNOSIS — H40.9 UNSPECIFIED GLAUCOMA: ICD-10-CM

## 2017-07-20 DIAGNOSIS — T46.4X5A ADVERSE EFFECT OF ANGIOTENSIN-CONVERTING-ENZYME INHIBITORS, INITIAL ENCOUNTER: ICD-10-CM

## 2017-07-20 DIAGNOSIS — E78.5 HYPERLIPIDEMIA, UNSPECIFIED: ICD-10-CM

## 2017-07-20 DIAGNOSIS — T78.3XXA ANGIONEUROTIC EDEMA, INITIAL ENCOUNTER: ICD-10-CM

## 2017-08-07 RX ORDER — LISINOPRIL 2.5 MG/1
1 TABLET ORAL
Qty: 0 | Refills: 0 | COMMUNITY

## 2017-08-07 RX ORDER — BRIMONIDINE TARTRATE 2 MG/MG
0 SOLUTION/ DROPS OPHTHALMIC
Qty: 0 | Refills: 0 | COMMUNITY

## 2017-08-07 RX ORDER — LATANOPROST 0.05 MG/ML
1 SOLUTION/ DROPS OPHTHALMIC; TOPICAL
Qty: 0 | Refills: 0 | COMMUNITY

## 2017-08-07 RX ORDER — ATORVASTATIN CALCIUM 80 MG/1
1 TABLET, FILM COATED ORAL
Qty: 0 | Refills: 0 | COMMUNITY

## 2019-05-18 PROBLEM — H40.9 UNSPECIFIED GLAUCOMA: Chronic | Status: ACTIVE | Noted: 2017-07-15

## 2019-05-18 PROBLEM — I10 ESSENTIAL (PRIMARY) HYPERTENSION: Chronic | Status: ACTIVE | Noted: 2017-07-15

## 2019-05-18 PROBLEM — E78.00 PURE HYPERCHOLESTEROLEMIA, UNSPECIFIED: Chronic | Status: ACTIVE | Noted: 2017-07-15

## 2019-05-22 ENCOUNTER — APPOINTMENT (OUTPATIENT)
Dept: ORTHOPEDIC SURGERY | Facility: CLINIC | Age: 69
End: 2019-05-22
Payer: MEDICARE

## 2019-05-22 VITALS — DIASTOLIC BLOOD PRESSURE: 78 MMHG | HEART RATE: 61 BPM | SYSTOLIC BLOOD PRESSURE: 209 MMHG

## 2019-05-22 PROCEDURE — 72100 X-RAY EXAM L-S SPINE 2/3 VWS: CPT

## 2019-05-22 PROCEDURE — 99203 OFFICE O/P NEW LOW 30 MIN: CPT

## 2019-05-29 ENCOUNTER — OTHER (OUTPATIENT)
Age: 69
End: 2019-05-29

## 2019-06-05 ENCOUNTER — APPOINTMENT (OUTPATIENT)
Dept: MRI IMAGING | Facility: CLINIC | Age: 69
End: 2019-06-05

## 2019-06-14 ENCOUNTER — CHART COPY (OUTPATIENT)
Age: 69
End: 2019-06-14

## 2019-06-26 ENCOUNTER — CHART COPY (OUTPATIENT)
Age: 69
End: 2019-06-26

## 2019-06-26 NOTE — PHYSICAL EXAM
[de-identified] : Examination of the lumbar spine reveals no midline tenderness palpation, step-offs, or skin lesions. Decreased range of motion with respect to flexion, extension, lateral bending, and rotation. No tenderness to palpation of the sciatic notch. No tenderness palpation of the bilateral greater trochanters. No pain with passive internal/external rotation of the hips. No instability of bilateral lower extremities.  Negative MARU. Negative straight leg raise bilaterally. No bowstring. Negative femoral stretch. 5 out of 5 iliopsoas, hip abductors, hips adductors, quadriceps, hamstrings, gastrocsoleus, tibialis anterior, extensor hallucis longus, peroneals. Grossly intact sensation to light touch bilateral lower extremities. 1+ patellar and Achilles reflexes. Downgoing Babinski. No clonus. Intact proprioception. Palpable pulses. No skin lesion and no edema on the right and left lower extremities. [de-identified] : AP lateral lumbar x-rays reveals L4-5 spondylolisthesis

## 2019-06-26 NOTE — HISTORY OF PRESENT ILLNESS
[de-identified] : Patient is a 69-year-old female with a chief complaint of back pain. She complains of pain worse with activity. No recent treatment. No numbness, tingling, weakness, or bowel or bladder dysfunction. No difficulty with balance or fine motor skills. No fevers or chills. No constitutional symptoms or recent unintended weight loss.

## 2019-06-28 ENCOUNTER — FORM ENCOUNTER (OUTPATIENT)
Age: 69
End: 2019-06-28

## 2019-06-29 ENCOUNTER — APPOINTMENT (OUTPATIENT)
Dept: CT IMAGING | Facility: CLINIC | Age: 69
End: 2019-06-29
Payer: MEDICARE

## 2019-06-29 ENCOUNTER — OUTPATIENT (OUTPATIENT)
Dept: OUTPATIENT SERVICES | Facility: HOSPITAL | Age: 69
LOS: 1 days | End: 2019-06-29
Payer: MEDICARE

## 2019-06-29 DIAGNOSIS — Z00.8 ENCOUNTER FOR OTHER GENERAL EXAMINATION: ICD-10-CM

## 2019-06-29 PROCEDURE — 72131 CT LUMBAR SPINE W/O DYE: CPT | Mod: 26

## 2019-06-29 PROCEDURE — 72131 CT LUMBAR SPINE W/O DYE: CPT

## 2019-07-05 ENCOUNTER — CHART COPY (OUTPATIENT)
Age: 69
End: 2019-07-05

## 2019-07-12 ENCOUNTER — APPOINTMENT (OUTPATIENT)
Dept: ORTHOPEDIC SURGERY | Facility: CLINIC | Age: 69
End: 2019-07-12
Payer: MEDICARE

## 2019-07-12 VITALS
WEIGHT: 121 LBS | HEIGHT: 65 IN | BODY MASS INDEX: 20.16 KG/M2 | HEART RATE: 69 BPM | DIASTOLIC BLOOD PRESSURE: 77 MMHG | SYSTOLIC BLOOD PRESSURE: 186 MMHG

## 2019-07-12 DIAGNOSIS — M48.07 SPINAL STENOSIS, LUMBOSACRAL REGION: ICD-10-CM

## 2019-07-12 DIAGNOSIS — M43.16 SPONDYLOLISTHESIS, LUMBAR REGION: ICD-10-CM

## 2019-07-12 DIAGNOSIS — M54.16 RADICULOPATHY, LUMBAR REGION: ICD-10-CM

## 2019-07-12 PROCEDURE — 99214 OFFICE O/P EST MOD 30 MIN: CPT

## 2019-07-12 NOTE — PHYSICAL EXAM
[de-identified] : Examination of the lumbar spine reveals no midline tenderness palpation, step-offs, or skin lesions. Decreased range of motion with respect to flexion, extension, lateral bending, and rotation. No tenderness to palpation of the sciatic notch. No tenderness palpation of the bilateral greater trochanters. No pain with passive internal/external rotation of the hips. No instability of bilateral lower extremities.  Negative MARU. Negative straight leg raise bilaterally. No bowstring. Negative femoral stretch. 5 out of 5 iliopsoas, hip abductors, hips adductors, quadriceps, hamstrings, gastrocsoleus, tibialis anterior, extensor hallucis longus, peroneals. Grossly intact sensation to light touch bilateral lower extremities. 1+ patellar and Achilles reflexes. Downgoing Babinski. No clonus. Intact proprioception. Palpable pulses. No skin lesion and no edema on the right and left lower extremities. [de-identified] : AP lateral lumbar x-rays reveals L4-5 spondylolisthesis\par \par CT scan reveals L4-5 spondylolisthesis with significant stenosis. There is also vacuum disc at L5-S1 with some stenosis

## 2019-07-12 NOTE — DISCUSSION/SUMMARY
[de-identified] : We discussed further treatment options. She's had nonsurgical care in the past but nothing recently. We did discuss the role of surgery with a lumbar decompression and instrumented fusion. She like to be reevaluated for nonoperative treatment in the form of physical therapy and epidural injections. She will be referred for this. Follow up afterwards.

## 2019-07-12 NOTE — HISTORY OF PRESENT ILLNESS
[de-identified] : Ms. Pitts is a 69-year-old female who is here for a follow-up visit to review her lumbar CT results.  She has persistent low back pain (right > left) and cramping in both feet.  Standing and walking is difficult.  Sitting does not relieve her symptoms.

## 2021-04-01 ENCOUNTER — APPOINTMENT (OUTPATIENT)
Age: 71
End: 2021-04-01
Payer: MEDICARE

## 2021-04-01 PROCEDURE — 0031A: CPT

## 2022-11-15 ENCOUNTER — APPOINTMENT (OUTPATIENT)
Dept: GERIATRICS | Facility: CLINIC | Age: 72
End: 2022-11-15

## 2022-11-15 VITALS
RESPIRATION RATE: 15 BRPM | WEIGHT: 132 LBS | TEMPERATURE: 97.5 F | SYSTOLIC BLOOD PRESSURE: 154 MMHG | DIASTOLIC BLOOD PRESSURE: 76 MMHG | OXYGEN SATURATION: 98 % | BODY MASS INDEX: 20.96 KG/M2 | HEIGHT: 66.5 IN | HEART RATE: 62 BPM

## 2022-11-15 DIAGNOSIS — Z81.8 FAMILY HISTORY OF OTHER MENTAL AND BEHAVIORAL DISORDERS: ICD-10-CM

## 2022-11-15 DIAGNOSIS — Z87.891 PERSONAL HISTORY OF NICOTINE DEPENDENCE: ICD-10-CM

## 2022-11-15 DIAGNOSIS — Z11.59 ENCOUNTER FOR SCREENING FOR OTHER VIRAL DISEASES: ICD-10-CM

## 2022-11-15 DIAGNOSIS — R32 UNSPECIFIED URINARY INCONTINENCE: ICD-10-CM

## 2022-11-15 DIAGNOSIS — Z87.898 PERSONAL HISTORY OF OTHER SPECIFIED CONDITIONS: ICD-10-CM

## 2022-11-15 PROCEDURE — G0444 DEPRESSION SCREEN ANNUAL: CPT

## 2022-11-15 PROCEDURE — 99205 OFFICE O/P NEW HI 60 MIN: CPT | Mod: 25

## 2022-11-15 NOTE — HISTORY OF PRESENT ILLNESS
[No falls in past year] : Patient reported no falls in the past year [Patient is independent with] : bathing [Independent] : transferring/mobility [Full assistance needed] : Assistance needed managing medications [] : Assistance needed managing finances. [0] : 1) Little interest or pleasure doing things: Not at all (0) [1] : 2) Feeling down, depressed, or hopeless for several days (1) [PHQ-2 Negative - No further assessment needed] : PHQ-2 Negative - No further assessment needed [I have developed a follow-up plan documented below in the note.] : I have developed a follow-up plan documented below in the note. [FreeTextEntry1] : \par \par Pt reports family brought her for evaluation of memory loss - she would like to have that fixed. \par \par Family provides additional history history. \par \par COGNITIVE CHANGES / [x ] MEMORY LOSS\par - Memory loss x >4yrs ago - repeating herself often. Now sometimes forgets that she's cooking and burns pots - last time it happened last week.\par Forgets directions even in familiar neighborhood. \par Gets very agitated about certain conversations - finances especially, thinking that people are making changes to her account - taking out and putting in money.  Episodes of thinking that Eliazar Tyra or Clemencia Soledad reached out to her and asked her to give money to others. Has purchased money orders to send to these people.  THinks that people are coming to get her because she doesn' send them money.  \par Was sent to [place] because of the paranoia to stay with sister - was calmer because didn't have to worry about the bills, etc. and was busy with sister. When returned more paranoid again. \par Forgets where she places things then says someone stole it. 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 Personality changed after aneurysm - became very Gnosticist, didn't believe in wearing pants or jewelrly, went back to work, stopped smoking and drinking alcohol. After a few years started drinking alcholol socially (never excessively).  Doesn't go out with friends as much as she used to prior to aneurysm. \par \par - Motor Syx: Gait instability since approx 2020 and getting worse, Knee arthritis\par Deny h/o falls\par \par - Sleep: goes to bed approx 9pm and wakes up at 3am \par \par - Appetite: good\par \par - Mood/behavior: generally pleasant, sometimes sad when feels "lost"\par \par - MOCA: \par - LABS: reviewed from 9/22 w/ PMD - cbc, cmp, lipids (high), TSH, b12, Folate\par - MRI Brain :  unable to get d/t aneurysm clips per family \par - PET FDG Brain : \par \par [x ] Neuro - Seen by neuro Dr. Ag in 2015 - family states was told "she's just bored."\par THen seen by another neuro DR. Villalobos outside of  and was told that "it's just age." \par [ ] Psych\par \par \par PMD Dr. Lindo - told family that she has memory issues.   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

## 2022-11-15 NOTE — REVIEW OF SYSTEMS
[Constipation] : constipation [Incontinence] : incontinence [As Noted in HPI] : as noted in HPI [Negative] : Heme/Lymph

## 2022-11-15 NOTE — REASON FOR VISIT
[Initial Evaluation] : an initial evaluation [FreeTextEntry1] : memory loss  [FreeTextEntry3] : dtnahum Montilla and  Eliazar

## 2022-11-15 NOTE — CURRENT MEDS
[] : missed dose(s) of medications. Reason(s): [Yes] : Reviewed medication list for presence of high-risk medications. [Patient/caregiver able to verbalize understanding of medications, including indications and side effects] : Patient/caregiver able to verbalize understanding of medications, including indications and side effects

## 2022-11-15 NOTE — PHYSICAL EXAM
[Normal] : no spinal tenderness [No Clubbing, Cyanosis] : no clubbing or cyanosis of the fingernails [Involuntary Movements] : no involuntary movements were seen [Normal Affect] : the affect was normal [Normal Mood] : the mood was normal [Normal Gait] : abnormal gait [Normal Insight/Judgment] : insight and judgment were not intact

## 2022-11-15 NOTE — ASSESSMENT
[FreeTextEntry1] : BW from 9/22 done with PMD reviewed. \par \par Family will try to obtain prior brain imaging, states pt was told cannot have MRI d/t clips placed for aneurysm repair.\par \par Would benefit from inc supervision for safety and help with ADLs\par Will refer to SW for additional counseling, education, support, resources\par \par HCP form briefly discussed -will assess capacity for completion at next visit\par \par Will consider neuro/neuropsych referral \par \par Fall precuations\par PT?\par \par Plan for cognitive/mood assessment at next visit.\par \par f/u in 2-4 wks, unless earlier PRN \par \par

## 2022-11-21 ENCOUNTER — APPOINTMENT (OUTPATIENT)
Dept: GERIATRICS | Facility: CLINIC | Age: 72
End: 2022-11-21

## 2022-11-21 VITALS
BODY MASS INDEX: 20.67 KG/M2 | RESPIRATION RATE: 15 BRPM | WEIGHT: 130 LBS | OXYGEN SATURATION: 96 % | SYSTOLIC BLOOD PRESSURE: 154 MMHG | HEART RATE: 71 BPM | DIASTOLIC BLOOD PRESSURE: 62 MMHG | TEMPERATURE: 98.1 F

## 2022-11-21 DIAGNOSIS — M25.562 PAIN IN LEFT KNEE: ICD-10-CM

## 2022-11-21 PROCEDURE — 99483 ASSMT & CARE PLN PT COG IMP: CPT

## 2023-01-26 NOTE — PATIENT PROFILE ADULT. - AS SC BRADEN ACTIVITY
Quality 226: Preventive Care And Screening: Tobacco Use: Screening And Cessation Intervention: Patient screened for tobacco use and is an ex/non-smoker
Quality 130: Documentation Of Current Medications In The Medical Record: Current Medications Documented
Detail Level: Detailed
(1) bedfast

## 2023-02-08 ENCOUNTER — NON-APPOINTMENT (OUTPATIENT)
Age: 73
End: 2023-02-08

## 2023-03-01 ENCOUNTER — APPOINTMENT (OUTPATIENT)
Dept: NEUROLOGY | Facility: CLINIC | Age: 73
End: 2023-03-01
Payer: MEDICARE

## 2023-03-01 PROCEDURE — 96132 NRPSYC TST EVAL PHYS/QHP 1ST: CPT

## 2023-03-01 PROCEDURE — 96116 NUBHVL XM PHYS/QHP 1ST HR: CPT

## 2023-03-01 PROCEDURE — 96133 NRPSYC TST EVAL PHYS/QHP EA: CPT

## 2023-03-01 PROCEDURE — 96138 PSYCL/NRPSYC TECH 1ST: CPT | Mod: NC

## 2023-03-01 PROCEDURE — 96121 NUBHVL XM PHY/QHP EA ADDL HR: CPT

## 2023-03-01 PROCEDURE — 96139 PSYCL/NRPSYC TST TECH EA: CPT | Mod: NC

## 2023-03-08 ENCOUNTER — APPOINTMENT (OUTPATIENT)
Dept: NEUROLOGY | Facility: CLINIC | Age: 73
End: 2023-03-08

## 2023-03-31 ENCOUNTER — APPOINTMENT (OUTPATIENT)
Dept: GERIATRICS | Facility: CLINIC | Age: 73
End: 2023-03-31
Payer: MEDICARE

## 2023-03-31 VITALS
DIASTOLIC BLOOD PRESSURE: 82 MMHG | SYSTOLIC BLOOD PRESSURE: 140 MMHG | TEMPERATURE: 97.1 F | RESPIRATION RATE: 16 BRPM | HEART RATE: 66 BPM | WEIGHT: 132.25 LBS | HEIGHT: 66 IN | BODY MASS INDEX: 21.25 KG/M2 | OXYGEN SATURATION: 98 %

## 2023-03-31 DIAGNOSIS — Z74.1 NEED FOR ASSISTANCE WITH PERSONAL CARE: ICD-10-CM

## 2023-03-31 DIAGNOSIS — H40.9 UNSPECIFIED GLAUCOMA: ICD-10-CM

## 2023-03-31 DIAGNOSIS — F22 DELUSIONAL DISORDERS: ICD-10-CM

## 2023-03-31 DIAGNOSIS — H54.7 UNSPECIFIED VISUAL LOSS: ICD-10-CM

## 2023-03-31 DIAGNOSIS — E55.9 VITAMIN D DEFICIENCY, UNSPECIFIED: ICD-10-CM

## 2023-03-31 PROCEDURE — 99214 OFFICE O/P EST MOD 30 MIN: CPT

## 2023-03-31 RX ORDER — ATORVASTATIN CALCIUM 40 MG/1
40 TABLET, FILM COATED ORAL
Refills: 0 | Status: ACTIVE | COMMUNITY
Start: 2022-04-08

## 2023-03-31 RX ORDER — LATANOPROST/PF 0.005 %
0.01 DROPS OPHTHALMIC (EYE)
Qty: 5 | Refills: 0 | Status: ACTIVE | COMMUNITY
Start: 2022-10-31

## 2023-03-31 RX ORDER — TIMOLOL MALEATE 5 MG/ML
0.5 SOLUTION OPHTHALMIC
Qty: 10 | Refills: 0 | Status: ACTIVE | COMMUNITY
Start: 2022-04-08

## 2023-03-31 RX ORDER — AMLODIPINE BESYLATE 5 MG/1
5 TABLET ORAL
Refills: 0 | Status: ACTIVE | COMMUNITY
Start: 2022-08-22

## 2023-04-06 NOTE — ASSESSMENT
[FreeTextEntry1] : - Neuropsych eval appreciated - f/u for completion of evaluation. \par Family unclear about plan for f/u with NS - will d/w Dr. Milan to clarify. \par \par f/u med records - family will try to obtain prior brain imaging, states pt was told pt now cannot have MRI d/t clips previously placed for aneurysm repair.\par \par Neuro referral discussed - family will consider \par \par - To consider trial of antidepressant for depressed mood/anxiety - will consider trial SSRi vs Duloxetine vs Gabapentin at next visit\par  \par - Advised inc daytime physical/cognitive activity - to consider formal day program and respite stays when possible/needed\par - Advised to consider support groups and classes for caregiver\par - Education, counseling, and support provided \par - Would benefit from formal care at home to help with personal care, etc. \par - Referred to  for additional counseling, education, support, and community resources\par \par - Pending appt with DCS/NP Jose for ADC Program enrollment \par \par Adv to avoid/limit use of NSAIDs\par Fall precuations\par \par Rest as per PMD\par \par f/u with me PRN

## 2023-04-06 NOTE — REASON FOR VISIT
[Follow-Up] : a follow-up visit [FreeTextEntry1] : dementia  [FreeTextEntry3] : dtnahum Montilla and  Eliazar  [FreeTextEntry2] : who assist with history due to patient with baseline cognitive impairment

## 2023-04-06 NOTE — HISTORY OF PRESENT ILLNESS
[No falls in past year] : Patient reported no falls in the past year [Patient is independent with] : bathing [Independent] : transferring/mobility [Full assistance needed] : Assistance needed managing medications [Moderate] : Stage: Moderate [Stable] : Status: Stable [Memory Lapses Or Loss] : stable memory impairment [Patient Observed To Be Agitated] : stable agitation [Hostility Toward Caregivers] : denies aggression [Sleep Disturbances] : stable sleep disturbances [] : denies wandering [Fixed Beliefs Contradicted By Reality (Delusions)] : denies delusions [Difficulty Finding Desired Words] : stable difficulty finding desired words [Other reason not done] : Other reason not done [FreeTextEntry1] : Seen by Neuropsych Dr. Milan in 3/23 - chart note appreciated in EMR. Pending f/u? \par \par TODAY patient denies having any complaints however limited historian d/t baseline confusion. \par \par - Motor Syx: Gait instability since approx 2020 and getting worse, Knee arthritis\par Deny h/o falls\par \par - Sleep: goes to bed approx 9pm and wakes up at 3am \par \par - Appetite: good\par \par - Mood/behavior: generally pleasant, sometimes sad when feels "lost"\par \par COGNITIVE CHANGES / [x ] MEMORY LOSS\par - 11/15/22: Memory loss x >4yrs ago - repeating herself often. Now sometimes forgets that she's cooking and burns pots - last time it happened last week. Forgets directions even in familiar neighborhood. \par Gets very agitated about certain conversations - finances especially, thinking that people are making changes to her account - taking out and putting in money.  Episodes of thinking that Eliazar Mary or Clemencia Rowe reached out to her and asked her to give money to others. Has purchased money orders to send to these people.  THinks that people are coming to get her because she doesn' send them money.  \par Was sent to [place] because of the paranoia to stay with sister - was calmer because didn't have to worry about the bills, etc. and was busy with sister. When returned more paranoid again. \par Forgets where she places things then says someone stole it. 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 Personality changed after aneurysm - became very Nondenominational, didn't believe in wearing pants or jewelrly, went back to work, stopped smoking and drinking alcohol. After a few years started drinking alcholol socially (never excessively).  Doesn't go out with friends as much as she used to prior to aneurysm. \par \par - MOCA: 5/30 on 11/21/22 \par - LABS: reviewed from 9/22 w/ PMD - cbc, cmp, lipids (high), TSH, b12, Folate\par - CTH : not available for review - pending medical records \par - MRI Brain :  unable to get d/t aneurysm clips per family \par - PET FDG Brain: \par \par [x ] Neuro - Seen by neuro Dr. Ag in 2015 - family states was told "she's just bored."\par Then seen by another neuro Dr. Villalobos outside of  and was told that "it's just age." \par [ ] Psych\par \par PMD Dr. Lindo  [BoneDensityDate] : 2022 [TextBox_37] : Julia Smith - Glaucoma  [Driving Concerns] : not driving or driving without noted concerns [[facility]] : >12  [FreeTextEntry8] : UI  [de-identified] : family helps [de-identified] : forgets how to cook and has burned pots on the stove  [FreeTextEntry6] :  drives , pt never drive  [FreeTextEntry7] : forgets to take meds sometimes  [de-identified] : PHQ2 of 1 on 11/15/22  [CornellScale] : 14 on 11/21/22  [AdvancecareDate] : 3/31/23 [FreeTextEntry4] : No HCP. Pt has no capacity to document HCP at this time.  Eliazar is NOK. \par Family has no previously discussed GOC - wish to have a discussion first amongs family and then we agree to plan a family meeting for GOCD and documentation of wishes

## 2023-04-06 NOTE — PHYSICAL EXAM
[Normal] : no spinal tenderness [No Clubbing, Cyanosis] : no clubbing or cyanosis of the fingernails [Involuntary Movements] : no involuntary movements were seen [Normal Color / Pigmentation] : normal skin color and pigmentation [Normal Affect] : the affect was normal [Normal Mood] : the mood was normal [Normal Gait] : abnormal gait [Normal Insight/Judgment] : insight and judgment were not intact [de-identified] : confused, unsteady gait  [MocaTotal] : 5 [FreeTextEntry1] : 11/21/22

## 2023-04-18 ENCOUNTER — NON-APPOINTMENT (OUTPATIENT)
Age: 73
End: 2023-04-18

## 2023-04-28 ENCOUNTER — APPOINTMENT (OUTPATIENT)
Dept: NEUROLOGY | Facility: CLINIC | Age: 73
End: 2023-04-28
Payer: MEDICARE

## 2023-04-28 PROCEDURE — 96133 NRPSYC TST EVAL PHYS/QHP EA: CPT

## 2023-05-02 ENCOUNTER — NON-APPOINTMENT (OUTPATIENT)
Age: 73
End: 2023-05-02

## 2023-05-18 ENCOUNTER — LABORATORY RESULT (OUTPATIENT)
Age: 73
End: 2023-05-18

## 2023-05-18 ENCOUNTER — APPOINTMENT (OUTPATIENT)
Dept: GERIATRICS | Facility: CLINIC | Age: 73
End: 2023-05-18
Payer: MEDICARE

## 2023-05-18 ENCOUNTER — NON-APPOINTMENT (OUTPATIENT)
Age: 73
End: 2023-05-18

## 2023-05-18 VITALS
RESPIRATION RATE: 15 BRPM | OXYGEN SATURATION: 96 % | BODY MASS INDEX: 21.47 KG/M2 | DIASTOLIC BLOOD PRESSURE: 69 MMHG | HEART RATE: 76 BPM | TEMPERATURE: 96.3 F | WEIGHT: 133 LBS | SYSTOLIC BLOOD PRESSURE: 161 MMHG

## 2023-05-18 DIAGNOSIS — Z13.29 ENCOUNTER FOR SCREENING FOR OTHER SUSPECTED ENDOCRINE DISORDER: ICD-10-CM

## 2023-05-18 DIAGNOSIS — E78.5 HYPERLIPIDEMIA, UNSPECIFIED: ICD-10-CM

## 2023-05-18 DIAGNOSIS — Z13.1 ENCOUNTER FOR SCREENING FOR DIABETES MELLITUS: ICD-10-CM

## 2023-05-18 DIAGNOSIS — Z71.89 OTHER SPECIFIED COUNSELING: ICD-10-CM

## 2023-05-18 PROCEDURE — 99483 ASSMT & CARE PLN PT COG IMP: CPT

## 2023-05-18 PROCEDURE — G2212 PROLONG OUTPT/OFFICE VIS: CPT

## 2023-05-18 RX ORDER — FLUTICASONE PROPIONATE 50 UG/1
50 SPRAY, METERED NASAL
Refills: 0 | Status: DISCONTINUED | COMMUNITY
Start: 2022-09-10 | End: 2023-05-18

## 2023-05-19 ENCOUNTER — NON-APPOINTMENT (OUTPATIENT)
Age: 73
End: 2023-05-19

## 2023-05-19 DIAGNOSIS — N39.0 URINARY TRACT INFECTION, SITE NOT SPECIFIED: ICD-10-CM

## 2023-05-19 LAB
25(OH)D3 SERPL-MCNC: 37 NG/ML
ALBUMIN SERPL ELPH-MCNC: 4.8 G/DL
ALP BLD-CCNC: 122 U/L
ALT SERPL-CCNC: 16 U/L
ANION GAP SERPL CALC-SCNC: 15 MMOL/L
APPEARANCE: ABNORMAL
AST SERPL-CCNC: 23 U/L
BILIRUB SERPL-MCNC: 0.3 MG/DL
BILIRUBIN URINE: NEGATIVE
BLOOD URINE: NEGATIVE
BUN SERPL-MCNC: 20 MG/DL
CALCIUM SERPL-MCNC: 10.3 MG/DL
CHLORIDE SERPL-SCNC: 104 MMOL/L
CHOLEST SERPL-MCNC: 262 MG/DL
CO2 SERPL-SCNC: 23 MMOL/L
COLOR: YELLOW
CREAT SERPL-MCNC: 0.94 MG/DL
EGFR: 63 ML/MIN/1.73M2
ESTIMATED AVERAGE GLUCOSE: 146 MG/DL
FOLATE SERPL-MCNC: 12.5 NG/ML
GLUCOSE QUALITATIVE U: NEGATIVE MG/DL
GLUCOSE SERPL-MCNC: 100 MG/DL
HBA1C MFR BLD HPLC: 6.7 %
HDLC SERPL-MCNC: 80 MG/DL
KETONES URINE: NEGATIVE MG/DL
LDLC SERPL CALC-MCNC: 139 MG/DL
LEUKOCYTE ESTERASE URINE: ABNORMAL
NITRITE URINE: NEGATIVE
NONHDLC SERPL-MCNC: 181 MG/DL
PH URINE: 6
POTASSIUM SERPL-SCNC: 4.5 MMOL/L
PROT SERPL-MCNC: 7.3 G/DL
PROTEIN URINE: NEGATIVE MG/DL
SODIUM SERPL-SCNC: 141 MMOL/L
SPECIFIC GRAVITY URINE: 1.02
TRIGL SERPL-MCNC: 214 MG/DL
TSH SERPL-ACNC: 1.09 UIU/ML
UROBILINOGEN URINE: 0.2 MG/DL
VIT B12 SERPL-MCNC: 579 PG/ML

## 2023-05-19 RX ORDER — KETOCONAZOLE 20 MG/G
2 CREAM TOPICAL
Qty: 60 | Refills: 0 | Status: ACTIVE | COMMUNITY
Start: 2023-04-27

## 2023-05-24 ENCOUNTER — NON-APPOINTMENT (OUTPATIENT)
Age: 73
End: 2023-05-24

## 2023-05-26 PROBLEM — Z71.89 ADVANCE CARE PLANNING: Status: ACTIVE | Noted: 2022-11-15

## 2023-05-26 PROBLEM — E78.5 HLD (HYPERLIPIDEMIA): Status: ACTIVE | Noted: 2022-11-15

## 2023-06-08 ENCOUNTER — APPOINTMENT (OUTPATIENT)
Dept: DERMATOLOGY | Facility: CLINIC | Age: 73
End: 2023-06-08

## 2023-07-18 ENCOUNTER — APPOINTMENT (OUTPATIENT)
Dept: DERMATOLOGY | Facility: CLINIC | Age: 73
End: 2023-07-18

## 2023-10-09 ENCOUNTER — APPOINTMENT (OUTPATIENT)
Dept: GERIATRICS | Facility: CLINIC | Age: 73
End: 2023-10-09

## 2023-11-14 ENCOUNTER — APPOINTMENT (OUTPATIENT)
Dept: GERIATRICS | Facility: CLINIC | Age: 73
End: 2023-11-14
Payer: MEDICARE

## 2023-11-14 ENCOUNTER — NON-APPOINTMENT (OUTPATIENT)
Age: 73
End: 2023-11-14

## 2023-11-14 DIAGNOSIS — M54.9 DORSALGIA, UNSPECIFIED: ICD-10-CM

## 2023-11-14 DIAGNOSIS — K59.09 OTHER CONSTIPATION: ICD-10-CM

## 2023-11-14 DIAGNOSIS — M51.36 OTHER INTERVERTEBRAL DISC DEGENERATION, LUMBAR REGION: ICD-10-CM

## 2023-11-14 DIAGNOSIS — G89.29 DORSALGIA, UNSPECIFIED: ICD-10-CM

## 2023-11-14 DIAGNOSIS — I10 ESSENTIAL (PRIMARY) HYPERTENSION: ICD-10-CM

## 2023-11-14 PROCEDURE — 99215 OFFICE O/P EST HI 40 MIN: CPT | Mod: 95

## 2023-11-14 RX ORDER — LIDOCAINE 40 MG/G
4 PATCH TOPICAL
Qty: 10 | Refills: 2 | Status: ACTIVE | COMMUNITY
Start: 2023-11-14 | End: 1900-01-01

## 2023-11-20 ENCOUNTER — NON-APPOINTMENT (OUTPATIENT)
Age: 73
End: 2023-11-20

## 2023-11-29 ENCOUNTER — NON-APPOINTMENT (OUTPATIENT)
Age: 73
End: 2023-11-29

## 2024-01-02 ENCOUNTER — APPOINTMENT (OUTPATIENT)
Dept: GERIATRICS | Facility: CLINIC | Age: 74
End: 2024-01-02
Payer: MEDICARE

## 2024-01-02 DIAGNOSIS — R26.81 UNSTEADINESS ON FEET: ICD-10-CM

## 2024-01-02 DIAGNOSIS — F03.918 UNSPECIFIED DEMENTIA, UNSPECIFIED SEVERITY, WITH OTHER BEHAVIORAL DISTURBANCE: ICD-10-CM

## 2024-01-02 DIAGNOSIS — F41.9 ANXIETY DISORDER, UNSPECIFIED: ICD-10-CM

## 2024-01-02 DIAGNOSIS — F32.A ANXIETY DISORDER, UNSPECIFIED: ICD-10-CM

## 2024-01-02 DIAGNOSIS — Z63.6 DEPENDENT RELATIVE NEEDING CARE AT HOME: ICD-10-CM

## 2024-01-02 PROCEDURE — 99443: CPT | Mod: 93

## 2024-01-02 RX ORDER — CEPHALEXIN 500 MG/1
500 TABLET ORAL
Qty: 10 | Refills: 0 | Status: COMPLETED | COMMUNITY
Start: 2023-05-19 | End: 2023-05-30

## 2024-01-02 SDOH — SOCIAL STABILITY - SOCIAL INSECURITY: DEPENDENT RELATIVE NEEDING CARE AT HOME: Z63.6

## 2024-01-15 ENCOUNTER — NON-APPOINTMENT (OUTPATIENT)
Age: 74
End: 2024-01-15

## 2024-01-22 ENCOUNTER — NON-APPOINTMENT (OUTPATIENT)
Age: 74
End: 2024-01-22

## 2024-01-31 ENCOUNTER — NON-APPOINTMENT (OUTPATIENT)
Age: 74
End: 2024-01-31

## 2024-02-09 ENCOUNTER — APPOINTMENT (OUTPATIENT)
Dept: GERIATRICS | Facility: CLINIC | Age: 74
End: 2024-02-09